# Patient Record
Sex: FEMALE
[De-identification: names, ages, dates, MRNs, and addresses within clinical notes are randomized per-mention and may not be internally consistent; named-entity substitution may affect disease eponyms.]

---

## 2020-07-01 ENCOUNTER — OFFICE VISIT (OUTPATIENT)
Age: 35
End: 2020-07-01

## 2021-03-01 ENCOUNTER — OFFICE VISIT (OUTPATIENT)
Age: 36
End: 2021-03-01

## 2021-04-01 ENCOUNTER — OFFICE VISIT (OUTPATIENT)
Age: 36
End: 2021-04-01

## 2021-08-04 ENCOUNTER — TELEPHONE ENCOUNTER (OUTPATIENT)
Dept: URBAN - METROPOLITAN AREA CLINIC 9 | Facility: CLINIC | Age: 36
End: 2021-08-04

## 2021-08-18 ENCOUNTER — TELEPHONE ENCOUNTER (OUTPATIENT)
Dept: URBAN - METROPOLITAN AREA CLINIC 9 | Facility: CLINIC | Age: 36
End: 2021-08-18

## 2021-08-20 ENCOUNTER — OFFICE VISIT (OUTPATIENT)
Dept: URBAN - METROPOLITAN AREA CLINIC 9 | Facility: CLINIC | Age: 36
End: 2021-08-20

## 2021-08-27 ENCOUNTER — OFFICE VISIT (OUTPATIENT)
Dept: URBAN - METROPOLITAN AREA CLINIC 9 | Facility: CLINIC | Age: 36
End: 2021-08-27

## 2021-09-09 ENCOUNTER — LAB OUTSIDE AN ENCOUNTER (OUTPATIENT)
Age: 36
End: 2021-09-09

## 2021-09-11 LAB
BASO (ABSOLUTE): (no result)
BASOS: (no result)
C-REACTIVE PROTEIN, QUANT: (no result)
EOS (ABSOLUTE): (no result)
EOS: (no result)
HEMATOCRIT: (no result)
HEMATOLOGY COMMENTS:: (no result)
HEMOGLOBIN: (no result)
IMMATURE CELLS: (no result)
IMMATURE GRANS (ABS): (no result)
IMMATURE GRANULOCYTES: (no result)
IMMUNOGLOBULIN A, QN, SERUM: (no result)
LYMPHS (ABSOLUTE): (no result)
LYMPHS: (no result)
MCH: (no result)
MCHC: (no result)
MCV: (no result)
MONOCYTES(ABSOLUTE): (no result)
MONOCYTES: (no result)
NEUTROPHILS (ABSOLUTE): (no result)
NEUTROPHILS: (no result)
NRBC: (no result)
PLATELETS: (no result)
RBC: (no result)
RDW: (no result)
SEDIMENTATION RATE-WESTERGREN: (no result)
T-TRANSGLUTAMINASE (TTG) IGA: (no result)
WBC: (no result)

## 2021-10-05 ENCOUNTER — TELEPHONE ENCOUNTER (OUTPATIENT)
Dept: URBAN - METROPOLITAN AREA CLINIC 9 | Facility: CLINIC | Age: 36
End: 2021-10-05

## 2021-10-05 ENCOUNTER — OFFICE VISIT (OUTPATIENT)
Dept: URBAN - METROPOLITAN AREA CLINIC 9 | Facility: CLINIC | Age: 36
End: 2021-10-05

## 2021-10-08 ENCOUNTER — OFFICE VISIT (OUTPATIENT)
Dept: URBAN - METROPOLITAN AREA CLINIC 9 | Facility: CLINIC | Age: 36
End: 2021-10-08

## 2022-07-30 ENCOUNTER — TELEPHONE ENCOUNTER (OUTPATIENT)
Age: 37
End: 2022-07-30

## 2022-07-30 RX ORDER — WHEAT DEXTRIN 3 G/4 G
1 (ONE) POWDER (GRAM) ORAL DAILY
Qty: 0 | Refills: 2 | OUTPATIENT
Start: 2021-08-27 | End: 2021-09-26

## 2022-07-31 ENCOUNTER — TELEPHONE ENCOUNTER (OUTPATIENT)
Age: 37
End: 2022-07-31

## 2022-07-31 RX ORDER — WHEAT DEXTRIN 3 G/4 G
1 (ONE) POWDER (GRAM) ORAL DAILY
Qty: 0 | Refills: 2 | Status: ACTIVE | COMMUNITY
Start: 2021-08-27

## 2022-07-31 RX ORDER — DICYCLOMINE HYDROCHLORIDE 10 MG/1
1 (ONE) CAPSULE ORAL
Qty: 0 | Refills: 16 | Status: ACTIVE | COMMUNITY
Start: 2021-10-05

## 2022-07-31 RX ORDER — DICYCLOMINE HYDROCHLORIDE 10 MG/1
1 (ONE) CAPSULE ORAL
Qty: 0 | Refills: 16 | Status: ACTIVE | COMMUNITY
Start: 2021-08-27

## 2022-07-31 RX ORDER — WHEAT DEXTRIN 3 G/4 G
1 (ONE) POWDER (GRAM) ORAL DAILY
Qty: 0 | Refills: 2 | Status: ACTIVE | COMMUNITY
Start: 2021-10-05

## 2024-04-25 ENCOUNTER — OV EP (OUTPATIENT)
Dept: URBAN - METROPOLITAN AREA CLINIC 9 | Facility: CLINIC | Age: 39
End: 2024-04-25

## 2024-08-06 ENCOUNTER — OFFICE VISIT (OUTPATIENT)
Dept: URBAN - METROPOLITAN AREA CLINIC 9 | Facility: CLINIC | Age: 39
End: 2024-08-06
Payer: COMMERCIAL

## 2024-08-06 ENCOUNTER — DASHBOARD ENCOUNTERS (OUTPATIENT)
Age: 39
End: 2024-08-06

## 2024-08-06 VITALS
WEIGHT: 174 LBS | BODY MASS INDEX: 32.02 KG/M2 | HEIGHT: 62 IN | DIASTOLIC BLOOD PRESSURE: 78 MMHG | SYSTOLIC BLOOD PRESSURE: 118 MMHG

## 2024-08-06 DIAGNOSIS — K58.2 IRRITABLE BOWEL SYNDROME WITH BOTH CONSTIPATION AND DIARRHEA: ICD-10-CM

## 2024-08-06 PROCEDURE — 99213 OFFICE O/P EST LOW 20 MIN: CPT | Performed by: INTERNAL MEDICINE

## 2024-08-06 RX ORDER — LEVONORGESTREL AND ETHINYL ESTRADIOL 0.1-0.02MG
TAKE 1 TABLET BY MOUTH EVERY DAY AS DIRECTED KIT ORAL
Qty: 84 EACH | Refills: 0 | Status: ACTIVE | COMMUNITY

## 2024-08-06 RX ORDER — DICYCLOMINE HYDROCHLORIDE 10 MG/1
1 (ONE) CAPSULE ORAL
Qty: 0 | Refills: 16 | Status: ON HOLD | COMMUNITY
Start: 2021-10-05

## 2024-08-06 RX ORDER — FLUTICASONE PROPIONATE 50 UG/1
SPRAY, METERED NASAL
Qty: 48 GRAM | Status: ACTIVE | COMMUNITY

## 2024-08-06 RX ORDER — POLYETHYLENE GLYCOL 3350 17 G/17G
1 SCOOP MIXED WITH 8 OUNCES OF FLUID POWDER, FOR SOLUTION ORAL ONCE A DAY
OUTPATIENT
Start: 2024-08-06 | End: 2024-09-05

## 2024-08-06 RX ORDER — WHEAT DEXTRIN 3 G/4 G
1 (ONE) POWDER (GRAM) ORAL
Refills: 2 | Status: ACTIVE | COMMUNITY
Start: 2021-08-27

## 2024-08-06 RX ORDER — WHEAT DEXTRIN 3 G/3.5 G
1 TABLESPOON POWDER (GRAM) ORAL DAILY
Qty: 30 TABLET | Refills: 3 | OUTPATIENT
Start: 2024-08-06 | End: 2024-12-03

## 2024-08-06 RX ORDER — DICYCLOMINE HYDROCHLORIDE 10 MG/1
1 (ONE) CAPSULE ORAL
Qty: 90 | Refills: 1 | OUTPATIENT
Start: 2021-10-05 | End: 2024-10-05

## 2024-08-06 NOTE — HPI-TODAY'S VISIT:
Pt here for f/u of IBS . Hx/o IBS predominant constipatin Hx/o CDif s/p failed vanc with response to dificid . 2010 Colon with questionable ileitis that wasn't crohn's per pt . Pt uses the benefiber prn. We did discuss she use regularly. We also will refill her dicyclomine. She has the constipation issues and is not on any laxative. I advised she add miralax to the daily benefiber to help relieve her constipation. She is agreeable.  .

## 2024-08-21 ENCOUNTER — TELEPHONE ENCOUNTER (OUTPATIENT)
Dept: URBAN - METROPOLITAN AREA CLINIC 9 | Facility: CLINIC | Age: 39
End: 2024-08-21

## 2024-08-21 RX ORDER — DICYCLOMINE HYDROCHLORIDE 10 MG/1
1 (ONE) CAPSULE ORAL
Qty: 360 | Refills: 1
Start: 2021-10-05 | End: 2025-02-16

## 2024-11-14 ENCOUNTER — OFFICE VISIT (OUTPATIENT)
Dept: URBAN - METROPOLITAN AREA CLINIC 9 | Facility: CLINIC | Age: 39
End: 2024-11-14

## 2025-02-12 ENCOUNTER — OFFICE VISIT (OUTPATIENT)
Dept: URBAN - METROPOLITAN AREA CLINIC 9 | Facility: CLINIC | Age: 40
End: 2025-02-12
Payer: COMMERCIAL

## 2025-02-12 VITALS
DIASTOLIC BLOOD PRESSURE: 72 MMHG | BODY MASS INDEX: 31.83 KG/M2 | SYSTOLIC BLOOD PRESSURE: 124 MMHG | HEIGHT: 62 IN | WEIGHT: 173 LBS

## 2025-02-12 DIAGNOSIS — K58.2 IRRITABLE BOWEL SYNDROME WITH BOTH CONSTIPATION AND DIARRHEA: ICD-10-CM

## 2025-02-12 PROCEDURE — 99212 OFFICE O/P EST SF 10 MIN: CPT | Performed by: INTERNAL MEDICINE

## 2025-02-12 RX ORDER — WHEAT DEXTRIN 3 G/4 G
1 (ONE) POWDER (GRAM) ORAL
Refills: 2 | Status: ACTIVE | COMMUNITY
Start: 2021-08-27

## 2025-02-12 RX ORDER — WHEAT DEXTRIN 3 G/4 G
1 (ONE) POWDER (GRAM) ORAL
OUTPATIENT
Start: 2021-08-27

## 2025-02-12 RX ORDER — LEVONORGESTREL AND ETHINYL ESTRADIOL 0.1-0.02MG
TAKE 1 TABLET BY MOUTH EVERY DAY AS DIRECTED KIT ORAL
Qty: 84 EACH | Refills: 0 | Status: ON HOLD | COMMUNITY

## 2025-02-12 RX ORDER — FLUTICASONE PROPIONATE 50 UG/1
SPRAY, METERED NASAL
Qty: 48 GRAM | Status: ACTIVE | COMMUNITY

## 2025-02-12 RX ORDER — LEVOTHYROXINE SODIUM 125 UG/1
1 TABLET IN THE MORNING ON AN EMPTY STOMACH TABLET ORAL ONCE A DAY
Status: ACTIVE | COMMUNITY

## 2025-02-12 RX ORDER — DICYCLOMINE HYDROCHLORIDE 10 MG/1
1 (ONE) CAPSULE ORAL
Qty: 360 | Refills: 1 | Status: ACTIVE | COMMUNITY
Start: 2021-10-05 | End: 2025-02-16

## 2025-02-12 NOTE — HPI-TODAY'S VISIT:
Pt here for f/u of IBS . Hx/o IBS predominant constipation Hx/o CDif s/p failed vanc with response to dificid . 2010 Colon with questionable ileitis that wasn't crohn's per pt . Pt now s/p thryoid surgery and her constipation is improved Pt has had resolution of hier bowel issues and no longer needing miralax and benefiber.  . She is doing well. She seems to have normalization of her bowels so hold on further eval unless sx recurr.